# Patient Record
Sex: MALE | Race: WHITE | URBAN - METROPOLITAN AREA
[De-identification: names, ages, dates, MRNs, and addresses within clinical notes are randomized per-mention and may not be internally consistent; named-entity substitution may affect disease eponyms.]

---

## 2017-04-14 ENCOUNTER — HOSPITAL ENCOUNTER (EMERGENCY)
Facility: CLINIC | Age: 37
Discharge: HOME | End: 2017-04-15
Attending: FAMILY MEDICINE | Admitting: FAMILY MEDICINE
Payer: COMMERCIAL

## 2017-04-14 VITALS
OXYGEN SATURATION: 98 % | BODY MASS INDEX: 23.62 KG/M2 | DIASTOLIC BLOOD PRESSURE: 57 MMHG | WEIGHT: 200 LBS | HEART RATE: 56 BPM | SYSTOLIC BLOOD PRESSURE: 123 MMHG | TEMPERATURE: 98.1 F | HEIGHT: 77 IN | RESPIRATION RATE: 16 BRPM

## 2017-04-14 DIAGNOSIS — M54.50 ACUTE BILATERAL LOW BACK PAIN WITHOUT SCIATICA: ICD-10-CM

## 2017-04-14 PROCEDURE — 25000132 ZZH RX MED GY IP 250 OP 250 PS 637: Performed by: FAMILY MEDICINE

## 2017-04-14 PROCEDURE — 96372 THER/PROPH/DIAG INJ SC/IM: CPT

## 2017-04-14 PROCEDURE — 99285 EMERGENCY DEPT VISIT HI MDM: CPT | Mod: 25

## 2017-04-14 PROCEDURE — 99284 EMERGENCY DEPT VISIT MOD MDM: CPT | Performed by: FAMILY MEDICINE

## 2017-04-14 RX ORDER — HYDROCODONE BITARTRATE AND ACETAMINOPHEN 5; 325 MG/1; MG/1
1-2 TABLET ORAL EVERY 6 HOURS PRN
Qty: 25 TABLET | Refills: 0 | Status: SHIPPED | OUTPATIENT
Start: 2017-04-14

## 2017-04-14 RX ORDER — CYCLOBENZAPRINE HCL 10 MG
10 TABLET ORAL 3 TIMES DAILY PRN
Qty: 30 TABLET | Refills: 0 | Status: SHIPPED | OUTPATIENT
Start: 2017-04-14 | End: 2017-04-20

## 2017-04-14 RX ORDER — CYCLOBENZAPRINE HCL 10 MG
10 TABLET ORAL ONCE
Status: COMPLETED | OUTPATIENT
Start: 2017-04-14 | End: 2017-04-14

## 2017-04-14 RX ADMIN — CYCLOBENZAPRINE HYDROCHLORIDE 10 MG: 10 TABLET, FILM COATED ORAL at 23:57

## 2017-04-14 NOTE — ED AVS SNAPSHOT
[facility]    [address] [address]    Phone:  [phone]    Fax:  [phone]                                       Otis Mercado   MRN: [id number]    Department:  [facility]   Date of Visit:  4/14/2017           Patient Information     Date Of Birth          1980        Your diagnoses for this visit were:     Acute bilateral low back pain without sciatica        You were seen by Ash Aly MD.        Discharge Instructions       Return to the Emergency Room if the following occurs:     Worsened pain in the back, new abdominal pain, fever, difficulty with bowel or bladder, new weakness of your legs that doesn't go away, or for any concern at anytime.    Or, follow-up with the following provider as we discussed:     Return to your primary doctor as needed.    Medications discussed:    Ibuprofen 600mg every 6 hours for pain (7 days duration).  Tylenol 1000mg every 6 hours for pain (7 days duration).  Flexeril for pain not relieved by the above.  If not improved by the above, consider Norco for pain as needed.    If you received pain-relieving or sedating medication during your time in the ER, avoid alcohol, driving automobiles, or working with machinery.  Also, a responsible adult must stay with you.      If you had X-rays or labs done we will attempt to contact you if there is a change needed in your care.      Call the Nurse Advice Line at [phone] or [phone] for any concern at anytime.      24 Hour Appointment Hotline       To make an appointment at any [facility], call [phone] ([phone]). If you don't have a family doctor or clinic, we will help you find one. [facility] clinics are conveniently located to serve the needs of you and your family.             Review of your medicines      START taking        Dose / Directions Last dose taken    cyclobenzaprine 10 MG tablet   Commonly known as:   "FLEXERIL   Dose:  10 mg   Quantity:  30 tablet        Take 1 tablet (10 mg) by mouth 3 times daily as needed for muscle spasms   Refills:  0        HYDROcodone-acetaminophen 5-325 MG per tablet   Commonly known as:  NORCO   Dose:  1-2 tablet   Quantity:  25 tablet        Take 1-2 tablets by mouth every 6 hours as needed for moderate to severe pain   Refills:  0                Prescriptions were sent or printed at these locations (2 Prescriptions)                   Other Prescriptions                Printed at Department/Unit printer (2 of 2)         cyclobenzaprine (FLEXERIL) 10 MG tablet               HYDROcodone-acetaminophen (NORCO) 5-325 MG per tablet                Orders Needing Specimen Collection     None      Pending Results     No orders found for last 3 day(s).            Pending Culture Results     No orders found for last 3 day(s).            Test Results From Your Hospital Stay               Thank you for choosing [facility]       Thank you for choosing [facility] for your care. Our goal is always to provide you with excellent care. Hearing back from our patients is one way we can continue to improve our services. Please take a few minutes to complete the written survey that you may receive in the mail after you visit with us. Thank you!        [employer] Information     [employer] lets you send messages to your doctor, view your test results, renew your prescriptions, schedule appointments and more. To sign up, go to [email or web address] . Click on \"Log in\" on the left side of the screen, which will take you to the Welcome page. Then click on \"Sign up Now\" on the right side of the page.     You will be asked to enter the access code listed below, as well as some personal information. Please follow the directions to create your username and password.     Your access code is: [email or web address]  Expires: 2017 12:06 AM     Your access code will  in 90 days. If you need help or a new code, please call your " [facility] or [phone].        Care EveryWhere ID     This is your Care EveryWhere ID. This could be used by other organizations to access your [place] medical records  [id number]        After Visit Summary       This is your record. Keep this with you and show to your community pharmacist(s) and doctor(s) at your next visit.

## 2017-04-14 NOTE — ED AVS SNAPSHOT
[facility]    [address] [address]    Phone:  [phone]    Fax:  [phone]                                       Otis Mercado   MRN: [id number]    Department:  [facility]   Date of Visit:  4/14/2017           After Visit Summary Signature Page     I have received my discharge instructions, and my questions have been answered. I have discussed any challenges I see with this plan with the nurse or doctor.    ..........................................................................................................................................  Patient/Patient Representative Signature      ..........................................................................................................................................  Patient Representative Print Name and Relationship to Patient    ..................................................               ................................................  Date                                            Time    ..........................................................................................................................................  Reviewed by Signature/Title    ...................................................              ..............................................  Date                                                            Time

## 2017-04-15 PROCEDURE — 25000128 H RX IP 250 OP 636: Performed by: FAMILY MEDICINE

## 2017-04-15 PROCEDURE — 25000132 ZZH RX MED GY IP 250 OP 250 PS 637: Performed by: FAMILY MEDICINE

## 2017-04-15 RX ORDER — HYDROCODONE BITARTRATE AND ACETAMINOPHEN 5; 325 MG/1; MG/1
2 TABLET ORAL ONCE
Status: COMPLETED | OUTPATIENT
Start: 2017-04-15 | End: 2017-04-15

## 2017-04-15 RX ADMIN — HYDROMORPHONE HYDROCHLORIDE 1 MG: 1 INJECTION, SOLUTION INTRAMUSCULAR; INTRAVENOUS; SUBCUTANEOUS at 00:46

## 2017-04-15 RX ADMIN — HYDROCODONE BITARTRATE AND ACETAMINOPHEN 2 TABLET: 5; 325 TABLET ORAL at 00:10

## 2017-04-15 NOTE — DISCHARGE INSTRUCTIONS
Return to the Emergency Room if the following occurs:     Worsened pain in the back, new abdominal pain, fever, difficulty with bowel or bladder, new weakness of your legs that doesn't go away, or for any concern at anytime.    Or, follow-up with the following provider as we discussed:     Return to your primary doctor as needed.    Medications discussed:    Ibuprofen 600mg every 6 hours for pain (7 days duration).  Tylenol 1000mg every 6 hours for pain (7 days duration).  Flexeril for pain not relieved by the above.  If not improved by the above, consider Norco for pain as needed.    If you received pain-relieving or sedating medication during your time in the ER, avoid alcohol, driving automobiles, or working with machinery.  Also, a responsible adult must stay with you.      If you had X-rays or labs done we will attempt to contact you if there is a change needed in your care.      Call the Nurse Advice Line at [phone] or [phone] for any concern at anytime.

## 2017-04-15 NOTE — ED PROVIDER NOTES
"  HPI  Patient is a 36-year-old male presenting with low back pain.  He has a known history of right-sided back pain with sciatica which occurred about three years ago.  That episode was related to lifting.  He denies having had an MRI.  No lumbar injections.  No low back surgery.  He does not take medicine for pain on a regular basis.  He took 1000 mg of ibuprofen at about 7:00 PM tonight.    The patient has new onset low back pain starting today.  He felt \"a tweek\" in his low back while he was at work moving his ladder.  Shortly afterward, he began to feel more uncomfortable and recognized muscle spasms.  Now, the muscle spasms come on with any movement.  He denies any radiating symptoms into his buttock or into his lower extremity.  No difficulty with bowel or bladder.  No saddle anesthesia described.  No abdominal pain.  No dysuria, urgency, or frequency.  No hematuria.  No nausea or vomiting.  No fever.  No other trauma or injury reported.    ROS: All other review of systems are negative other than that noted above.    PMH: Reviewed.  SH: Reviewed.  FH: Reviewed.      PHYSICAL  /57  Pulse 56  Temp 98.1  F (36.7  C) (Oral)  Resp 16  Ht 1.956 m (6' 5\")  Wt 90.7 kg (200 lb)  SpO2 98%  BMI 23.72 kg/m2  General: Patient is alert and in moderate distress.  He remains flat on the bed.  Neurological: Alert.  Moving upper and lower extremities equally, bilaterally.  Head / Neck: Atraumatic.  Ears: Not done.  Eyes: Pupils are equal, round, and reactive.  Normal conjunctiva.  Nose: Midline.  No epistaxis.  Mouth / Throat: Moist. Respiratory: No respiratory distress. Cardiovascular: Regular rhythm.  Peripheral extremities are warm.    Abdomen / Pelvis: Not done. Genitalia: Not done.  Musculoskeletal: Tender lumbar musculature.  Strength five out of five in his lower extremities, bilaterally.  Sensation grossly intact to touch.  Skin: No evidence of rash or trauma.        PHYSICIAN  Patient has low back pain " without sciatica.  He is requesting a muscle relaxer as this helped last time.  Flexeril is ordered.  He would like a prescription for Flexeril at home.  We will monitor him here over the next 30-60 minutes to see if he is getting relief before discharging.      IMPRESSION    ICD-10-CM    1. Acute bilateral low back pain without sciatica M54.5               Ash Aly MD  04/14/17 [id number]

## 2017-04-15 NOTE — ED NOTES
Attempted to ambulate patient - was able to stand although is in excrutiating pain and unable to ambulate - father is at bedside - MD updated and orders received.